# Patient Record
Sex: FEMALE | Race: WHITE | Employment: FULL TIME | URBAN - METROPOLITAN AREA
[De-identification: names, ages, dates, MRNs, and addresses within clinical notes are randomized per-mention and may not be internally consistent; named-entity substitution may affect disease eponyms.]

---

## 2022-03-13 ENCOUNTER — HOSPITAL ENCOUNTER (INPATIENT)
Age: 42
LOS: 2 days | Discharge: HOME | DRG: 885 | End: 2022-03-15
Attending: EMERGENCY MEDICINE | Admitting: PSYCHIATRY & NEUROLOGY
Payer: COMMERCIAL

## 2022-03-13 DIAGNOSIS — T14.91XA SUICIDE ATTEMPT (HCC): Primary | ICD-10-CM

## 2022-03-13 PROBLEM — E66.01 MORBID OBESITY (HCC): Status: ACTIVE | Noted: 2022-03-13

## 2022-03-13 PROBLEM — F33.2 MAJOR DEPRESSIVE DISORDER, RECURRENT EPISODE, SEVERE WITH ANXIOUS DISTRESS (HCC): Status: ACTIVE | Noted: 2022-03-13

## 2022-03-13 PROBLEM — F10.20 ALCOHOLISM (HCC): Status: ACTIVE | Noted: 2022-03-13

## 2022-03-13 PROBLEM — R45.851 DEPRESSION WITH SUICIDAL IDEATION: Status: ACTIVE | Noted: 2022-03-13

## 2022-03-13 PROBLEM — F32.A DEPRESSION WITH SUICIDAL IDEATION: Status: ACTIVE | Noted: 2022-03-13

## 2022-03-13 LAB
ABSOLUTE EOS #: 0 K/UL (ref 0–0.4)
ABSOLUTE LYMPH #: 1.9 K/UL (ref 1–4.8)
ABSOLUTE MONO #: 0.8 K/UL (ref 0.1–1.3)
ANION GAP SERPL CALCULATED.3IONS-SCNC: 12 MMOL/L (ref 9–17)
BASOPHILS # BLD: 1 % (ref 0–2)
BASOPHILS ABSOLUTE: 0.1 K/UL (ref 0–0.2)
BUN BLDV-MCNC: 14 MG/DL (ref 6–20)
CALCIUM SERPL-MCNC: 9.5 MG/DL (ref 8.6–10.4)
CHLORIDE BLD-SCNC: 104 MMOL/L (ref 98–107)
CO2: 23 MMOL/L (ref 20–31)
CREAT SERPL-MCNC: 0.94 MG/DL (ref 0.5–0.9)
EOSINOPHILS RELATIVE PERCENT: 0 % (ref 0–4)
ETHANOL PERCENT: <0.01 %
ETHANOL: <10 MG/DL
GFR AFRICAN AMERICAN: >60 ML/MIN
GFR NON-AFRICAN AMERICAN: >60 ML/MIN
GFR SERPL CREATININE-BSD FRML MDRD: ABNORMAL ML/MIN/{1.73_M2}
GLUCOSE BLD-MCNC: 109 MG/DL (ref 70–99)
HCG QUALITATIVE: NEGATIVE
HCT VFR BLD CALC: 36.4 % (ref 36–46)
HEMOGLOBIN: 12 G/DL (ref 12–16)
LYMPHOCYTES # BLD: 17 % (ref 24–44)
MCH RBC QN AUTO: 26.9 PG (ref 26–34)
MCHC RBC AUTO-ENTMCNC: 33 G/DL (ref 31–37)
MCV RBC AUTO: 81.6 FL (ref 80–100)
MONOCYTES # BLD: 7 % (ref 1–7)
PDW BLD-RTO: 16.5 % (ref 11.5–14.9)
PLATELET # BLD: 298 K/UL (ref 150–450)
PMV BLD AUTO: 7.6 FL (ref 6–12)
POTASSIUM SERPL-SCNC: 3.8 MMOL/L (ref 3.7–5.3)
RBC # BLD: 4.46 M/UL (ref 4–5.2)
SARS-COV-2, RAPID: NOT DETECTED
SEG NEUTROPHILS: 75 % (ref 36–66)
SEGMENTED NEUTROPHILS ABSOLUTE COUNT: 8.2 K/UL (ref 1.3–9.1)
SODIUM BLD-SCNC: 139 MMOL/L (ref 135–144)
SPECIMEN DESCRIPTION: NORMAL
WBC # BLD: 11 K/UL (ref 3.5–11)

## 2022-03-13 PROCEDURE — 99223 1ST HOSP IP/OBS HIGH 75: CPT | Performed by: PSYCHIATRY & NEUROLOGY

## 2022-03-13 PROCEDURE — 84703 CHORIONIC GONADOTROPIN ASSAY: CPT

## 2022-03-13 PROCEDURE — 80048 BASIC METABOLIC PNL TOTAL CA: CPT

## 2022-03-13 PROCEDURE — 6370000000 HC RX 637 (ALT 250 FOR IP): Performed by: PSYCHIATRY & NEUROLOGY

## 2022-03-13 PROCEDURE — G0480 DRUG TEST DEF 1-7 CLASSES: HCPCS

## 2022-03-13 PROCEDURE — 87635 SARS-COV-2 COVID-19 AMP PRB: CPT

## 2022-03-13 PROCEDURE — 99283 EMERGENCY DEPT VISIT LOW MDM: CPT

## 2022-03-13 PROCEDURE — APPSS180 APP SPLIT SHARED TIME > 60 MINUTES: Performed by: NURSE PRACTITIONER

## 2022-03-13 PROCEDURE — 85025 COMPLETE CBC W/AUTO DIFF WBC: CPT

## 2022-03-13 PROCEDURE — 1240000000 HC EMOTIONAL WELLNESS R&B

## 2022-03-13 PROCEDURE — 36415 COLL VENOUS BLD VENIPUNCTURE: CPT

## 2022-03-13 PROCEDURE — 99222 1ST HOSP IP/OBS MODERATE 55: CPT | Performed by: INTERNAL MEDICINE

## 2022-03-13 RX ORDER — MIRTAZAPINE 30 MG/1
30 TABLET, FILM COATED ORAL NIGHTLY
Status: ON HOLD | COMMUNITY
End: 2022-03-15 | Stop reason: HOSPADM

## 2022-03-13 RX ORDER — LORAZEPAM 2 MG/ML
2 INJECTION INTRAMUSCULAR EVERY 4 HOURS PRN
Status: DISCONTINUED | OUTPATIENT
Start: 2022-03-13 | End: 2022-03-15 | Stop reason: HOSPADM

## 2022-03-13 RX ORDER — BUPROPION HYDROCHLORIDE 300 MG/1
300 TABLET ORAL NIGHTLY
COMMUNITY

## 2022-03-13 RX ORDER — HYDROXYZINE 50 MG/1
50 TABLET, FILM COATED ORAL 3 TIMES DAILY PRN
Status: DISCONTINUED | OUTPATIENT
Start: 2022-03-13 | End: 2022-03-15 | Stop reason: HOSPADM

## 2022-03-13 RX ORDER — SERTRALINE HYDROCHLORIDE 25 MG/1
25 TABLET, FILM COATED ORAL DAILY
Status: DISCONTINUED | OUTPATIENT
Start: 2022-03-13 | End: 2022-03-15 | Stop reason: HOSPADM

## 2022-03-13 RX ORDER — ACETAMINOPHEN 325 MG/1
650 TABLET ORAL EVERY 4 HOURS PRN
Status: DISCONTINUED | OUTPATIENT
Start: 2022-03-13 | End: 2022-03-15 | Stop reason: HOSPADM

## 2022-03-13 RX ORDER — HALOPERIDOL 5 MG
5 TABLET ORAL EVERY 4 HOURS PRN
Status: DISCONTINUED | OUTPATIENT
Start: 2022-03-13 | End: 2022-03-15 | Stop reason: HOSPADM

## 2022-03-13 RX ORDER — DIPHENHYDRAMINE HYDROCHLORIDE 50 MG/ML
50 INJECTION INTRAMUSCULAR; INTRAVENOUS EVERY 4 HOURS PRN
Status: DISCONTINUED | OUTPATIENT
Start: 2022-03-13 | End: 2022-03-15 | Stop reason: HOSPADM

## 2022-03-13 RX ORDER — LORAZEPAM 1 MG/1
2 TABLET ORAL EVERY 4 HOURS PRN
Status: DISCONTINUED | OUTPATIENT
Start: 2022-03-13 | End: 2022-03-15 | Stop reason: HOSPADM

## 2022-03-13 RX ORDER — BUPROPION HYDROCHLORIDE 300 MG/1
300 TABLET ORAL DAILY
Status: DISCONTINUED | OUTPATIENT
Start: 2022-03-13 | End: 2022-03-15 | Stop reason: HOSPADM

## 2022-03-13 RX ORDER — IBUPROFEN 400 MG/1
400 TABLET ORAL EVERY 6 HOURS PRN
Status: DISCONTINUED | OUTPATIENT
Start: 2022-03-13 | End: 2022-03-15 | Stop reason: HOSPADM

## 2022-03-13 RX ORDER — MINOCYCLINE HYDROCHLORIDE 50 MG/1
50 CAPSULE ORAL 2 TIMES DAILY
Status: ON HOLD | COMMUNITY
End: 2022-03-15 | Stop reason: HOSPADM

## 2022-03-13 RX ORDER — POLYETHYLENE GLYCOL 3350 17 G/17G
17 POWDER, FOR SOLUTION ORAL DAILY PRN
Status: DISCONTINUED | OUTPATIENT
Start: 2022-03-13 | End: 2022-03-15 | Stop reason: HOSPADM

## 2022-03-13 RX ORDER — MAGNESIUM HYDROXIDE/ALUMINUM HYDROXICE/SIMETHICONE 120; 1200; 1200 MG/30ML; MG/30ML; MG/30ML
30 SUSPENSION ORAL EVERY 6 HOURS PRN
Status: DISCONTINUED | OUTPATIENT
Start: 2022-03-13 | End: 2022-03-15 | Stop reason: HOSPADM

## 2022-03-13 RX ORDER — HALOPERIDOL 5 MG/ML
5 INJECTION INTRAMUSCULAR EVERY 4 HOURS PRN
Status: DISCONTINUED | OUTPATIENT
Start: 2022-03-13 | End: 2022-03-15 | Stop reason: HOSPADM

## 2022-03-13 RX ORDER — TRAZODONE HYDROCHLORIDE 50 MG/1
50 TABLET ORAL NIGHTLY PRN
Status: DISCONTINUED | OUTPATIENT
Start: 2022-03-13 | End: 2022-03-15 | Stop reason: HOSPADM

## 2022-03-13 RX ADMIN — ACETAMINOPHEN 650 MG: 325 TABLET, FILM COATED ORAL at 12:08

## 2022-03-13 RX ADMIN — SERTRALINE HYDROCHLORIDE 25 MG: 25 TABLET ORAL at 17:06

## 2022-03-13 RX ADMIN — HYDROXYZINE HYDROCHLORIDE 50 MG: 50 TABLET, FILM COATED ORAL at 04:38

## 2022-03-13 RX ADMIN — BUPROPION HYDROCHLORIDE 300 MG: 300 TABLET, FILM COATED, EXTENDED RELEASE ORAL at 17:06

## 2022-03-13 ASSESSMENT — PAIN SCALES - GENERAL
PAINLEVEL_OUTOF10: 3
PAINLEVEL_OUTOF10: 0

## 2022-03-13 ASSESSMENT — SLEEP AND FATIGUE QUESTIONNAIRES
DIFFICULTY STAYING ASLEEP: YES
DIFFICULTY FALLING ASLEEP: YES
DO YOU USE A SLEEP AID: YES
DIFFICULTY ARISING: NO
DO YOU HAVE DIFFICULTY SLEEPING: YES
SLEEP PATTERN: DIFFICULTY FALLING ASLEEP;DISTURBED/INTERRUPTED SLEEP;RESTLESSNESS
AVERAGE NUMBER OF SLEEP HOURS: 4
RESTFUL SLEEP: NO

## 2022-03-13 ASSESSMENT — LIFESTYLE VARIABLES: HISTORY_ALCOHOL_USE: NO

## 2022-03-13 NOTE — BH NOTE
Patient recorded numbers out of her cell phone. Educated on phone being locked up with security until discharge.

## 2022-03-13 NOTE — H&P
[facility]  Ella Vivar MD; Jeff Eubanks MD; Dorcas Miranda MD; MD Russ Portillo MD; MD JOSE Wynn [facility]   [address] / HISTORY AND PHYSICAL EXAMINATION            Date:   3/13/2022  Patient name:  Mannie Santos  Date of admission:  3/13/2022 12:40 AM  MRN:   [id number]  Account:  [de-identified]  YOB: 1980  PCP:    No primary care provider on file. Room:   [address] [place] [address]  Code Status:    Full Code    Physician Requesting Consult: Skylar Vernon MD    Reason for Consult: Medical consult    Chief Complaint:     Chief Complaint   Patient presents with    Suicidal       History Obtained From:     patient    History of Present Illness:     81-year-old female with underlying history of morbid obesity, alcoholism, admitted to inpatient psych for suicidal ideation ,  History of depression and anxiety    Past Medical History:     Past Medical History:   Diagnosis Date    Depression         Past Surgical History:     History reviewed. No pertinent surgical history. Medications Prior to Admission:     Prior to Admission medications    Medication Sig Start Date End Date Taking? Authorizing Provider   buPROPion (WELLBUTRIN XL) 300 MG extended release tablet Take 300 mg by mouth nightly    Yes Historical Provider, MD   mirtazapine (REMERON) 30 MG tablet Take 30 mg by mouth nightly   Yes Historical Provider, MD   minocycline (MINOCIN;DYNACIN) 50 MG capsule Take 50 mg by mouth 2 times daily   Yes Historical Provider, MD        Allergies:     Patient has no known allergies. Social History:     Tobacco:    reports that she has quit smoking. She has never used smokeless tobacco.  Alcohol:      reports current alcohol use. Drug Use:  reports no history of drug use. Family History:     History reviewed. No pertinent family history.     Review of Systems:     Positive and Negative as described in HPI. CONSTITUTIONAL:  negative for fevers, chills, sweats, fatigue, weight loss  HEENT:  negative for vision, hearing changes, runny nose, throat pain  RESPIRATORY:  negative for shortness of breath, cough, congestion, wheezing. CARDIOVASCULAR:  negative for chest pain, palpitations. GASTROINTESTINAL:  negative for nausea, vomiting, diarrhea, constipation, change in bowel habits, abdominal pain   GENITOURINARY:  negative for difficulty of urination, burning with urination, frequency   INTEGUMENT:  negative for rash, skin lesions, easy bruising   HEMATOLOGIC/LYMPHATIC:  negative for swelling/edema   ALLERGIC/IMMUNOLOGIC:  negative for urticaria , itching  ENDOCRINE:  negative increase in drinking, increase in urination, hot or cold intolerance  MUSCULOSKELETAL:  negative joint pains, muscle aches, swelling of joints  NEUROLOGICAL:  negative for headaches, dizziness, lightheadedness, numbness, pain, tingling extremities    Physical Exam:     /73   Pulse 73   Temp 97.1 °F (36.2 °C) (Temporal)   Resp 14   Ht 5' 5\" (1.651 m)   Wt 220 lb (99.8 kg)   LMP 2022   SpO2 100%   BMI 36.61 kg/m²   Temp (24hrs), Av.1 °F (36.7 °C), Min:97.1 °F (36.2 °C), Max:99.1 °F (37.3 °C)    No results for input(s): POCGLU in the last 72 hours. No intake or output data in the 24 hours ending 22 1607    General Appearance:  alert, well appearing, and in no acute distress  Mental status: oriented to person, place, and time with normal affect  Head:  normocephalic, atraumatic.   Eye: no icterus, redness, pupils equal and reactive, extraocular eye movements intact, conjunctiva clear  Ear: normal external ear, no discharge, hearing intact  Nose:  no drainage noted  Mouth: mucous membranes moist  Neck: supple, no carotid bruits, thyroid not palpable  Lungs: Bilateral equal air entry, clear to ausculation, no wheezing, rales or rhonchi, normal effort  Cardiovascular: normal rate, regular rhythm, no murmur, gallop, rub.   Abdomen: Soft, nontender, nondistended, normal bowel sounds, no hepatomegaly or splenomegaly  Neurologic: There are no new focal motor or sensory deficits, normal muscle tone and bulk, no abnormal sensation, normal speech, cranial nerves II through XII grossly intact  Skin: No gross lesions, rashes, bruising or bleeding on exposed skin area  Extremities:  peripheral pulses palpable, no pedal edema or calf pain with palpation  Psych: normal affect    Investigations:      Laboratory Testing:  Recent Results (from the past 24 hour(s))   CBC with Auto Differential    Collection Time: 03/13/22  1:57 AM   Result Value Ref Range    WBC 11.0 3.5 - 11.0 k/uL    RBC 4.46 4.0 - 5.2 m/uL    Hemoglobin 12.0 12.0 - 16.0 g/dL    Hematocrit 36.4 36 - 46 %    MCV 81.6 80 - 100 fL    MCH 26.9 26 - 34 pg    MCHC 33.0 31 - 37 g/dL    RDW 16.5 (H) 11.5 - 14.9 %    Platelets 751 364 - 181 k/uL    MPV 7.6 6.0 - 12.0 fL    Seg Neutrophils 75 (H) 36 - 66 %    Lymphocytes 17 (L) 24 - 44 %    Monocytes 7 1 - 7 %    Eosinophils % 0 0 - 4 %    Basophils 1 0 - 2 %    Segs Absolute 8.20 1.3 - 9.1 k/uL    Absolute Lymph # 1.90 1.0 - 4.8 k/uL    Absolute Mono # 0.80 0.1 - 1.3 k/uL    Absolute Eos # 0.00 0.0 - 0.4 k/uL    Basophils Absolute 0.10 0.0 - 0.2 k/uL   Basic Metabolic Panel w/ Reflex to MG    Collection Time: 03/13/22  1:57 AM   Result Value Ref Range    Glucose 109 (H) 70 - 99 mg/dL    BUN 14 6 - 20 mg/dL    CREATININE 0.94 (H) 0.50 - 0.90 mg/dL    Calcium 9.5 8.6 - 10.4 mg/dL    Sodium 139 135 - 144 mmol/L    Potassium 3.8 3.7 - 5.3 mmol/L    Chloride 104 98 - 107 mmol/L    CO2 23 20 - 31 mmol/L    Anion Gap 12 9 - 17 mmol/L    GFR Non-African American >60 >60 mL/min    GFR African American >60 >60 mL/min    GFR Comment         HCG Qualitative, Serum    Collection Time: 03/13/22  1:57 AM   Result Value Ref Range    hCG Qual NEGATIVE NEGATIVE   COVID-19, Rapid    Collection Time: 03/13/22  1:57 AM Specimen: Nasopharyngeal Swab   Result Value Ref Range    Specimen Description . NASOPHARYNGEAL SWAB     SARS-CoV-2, Rapid Not Detected Not Detected   Ethanol    Collection Time: 03/13/22  1:57 AM   Result Value Ref Range    Ethanol <10 <10 mg/dL    Ethanol percent <0.010 %       Imaging/Diagonstics:  No results found. Assessment :      Hospital Problems           Last Modified POA    * (Principal) Major depressive disorder, recurrent episode, severe with anxious distress ([facility] 3/13/2022 Yes    Alcoholism ([facility] 3/13/2022 Yes    Morbid obesity ([facility] 3/13/2022 Yes          Plan:     3 73-year-old female with underlying history of anxiety and depression, admitted to inpatient psych for suicidal ideations,  2. Alcoholism, watch for withdrawal,   3. Morbid obesity, diet and exercise advised,  4. Full CODE STATUS    Consultations:   Funmi Call MD  3/13/2022  4:07 PM    Copy sent to Dr. Gould primary care provider on file. Please note that this chart was generated using voice recognition Dragon dictation software. Although every effort was made to ensure the accuracy of this automated transcription, some errors in transcription may have occurred.
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
## 2022-03-13 NOTE — PROGRESS NOTES
Behavioral Services  Medicare Certification Upon Admission    I certify that this patient's inpatient psychiatric hospital admission is medically necessary for:    [x] (1) Treatment which could reasonably be expected to improve this patient's condition,       [x] (2) Or for diagnostic study;     AND     [x](2) The inpatient psychiatric services are provided while the individual is under the care of a physician and are included in the individualized plan of care.     Estimated length of stay/service 3-9 days    Plan for post-hospital care -outpatient care    Electronically signed by Flori Branch MD on 3/13/2022 at 4:27 PM

## 2022-03-13 NOTE — PLAN OF CARE
Problem: Suicide risk  Goal: Provide patient with safe environment  Description: Provide patient with safe environment  Outcome: Ongoing  Note: Patient is provided a safe environment. Safety checks are done every 15 minutes. Problem: Altered Mood, Depressive Behavior:  Goal: Able to verbalize support systems  Description: Able to verbalize support systems  Outcome: Ongoing  Note: Patient does not verbalize a support system. Problem: Depressive Behavior With or Without Suicide Precautions:  Goal: Able to verbalize acceptance of life and situations over which he or she has no control  Description: Able to verbalize acceptance of life and situations over which he or she has no control  Outcome: Ongoing  Note: Patient has been evasive and isolative to her room. She admits to depression and anxiety. She has been encouraged to eat, drink and take care of ADL's.

## 2022-03-13 NOTE — CARE COORDINATION
BHI Biopsychosocial Assessment    Current Level of Psychosocial Functioning     Independent X   Dependent    Minimal Assist     Comments:    Psychosocial High Risk Factors (check all that apply)    Unable to obtain meds   Chronic illness/pain    Substance abuse   Lack of Family Support X   Financial stress   Isolation   Inadequate Community Resources  Suicide attempt(s)  Not taking medications   Victim of crime   Developmental Delay  Unable to manage personal needs    Age 73 or older   Homeless  No transportation   Readmission within 30 days  Unemployment  Traumatic Event    Comments:   Psychiatric Advanced Directives:  N/A   Family to Involve in Treatment:   Pt declined family involvement   Sexual Orientation:    Heterosexual   Patient Strengths:  Pt is independent in self-care activities, pt has housing, pt is employed. Patient Barriers:   Pt lacks insight. Opiate Education Provided:  N/A   CMHC/mental health history:  Not linked  Plan of Care   medication management, group/individual therapies, family meetings, psycho -education, treatment team meetings to assist with stabilization    Initial Discharge Plan:    Pt plans to return home and be linked to local providers for therapy and medication. Clinical Summary:    Pt is a 37 y.o. female who presented to the ED with SI to stab herself in an interrupted suicide attempt, where her daughter called the police. Pt reports she is not linked with a mental health agency. Pt lacks family support. Pt lives with her  and 2 children, ages 21 & 12. Pt reports her  is supportive \"when he wants to be\". Pt denied trauma hx. Pt denied AOD abuse. Pt denied legal issues. Pt denied past suicide attempt. Pt works in accounting.

## 2022-03-13 NOTE — BH NOTE
On call provider, Dr. Mary Colmenares, notified of best practice advisory suggesting to place patient on suicide precautions. Provider to discontinue the order as patient does not meet criteria for suicide precautions at this time. Continue to observe patient on every 15 minute checks.
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
## 2022-03-13 NOTE — ED PROVIDER NOTES
[facility] EMERGENCY DEPARTMENT ENCOUNTER    Pt Name: Lauren aLng  MRN: [id number]  [place] 1980  Date of evaluation: 3/13/22  CHIEF COMPLAINT       Chief Complaint   Patient presents with    Suicidal     HISTORY OF PRESENT ILLNESS     Mental Health Problem  Presenting symptoms: hallucinations, suicidal threats and suicide attempt    Degree of incapacity (severity): Moderate  Onset quality:  Gradual  Timing:  Constant  Progression:  Unchanged    Patient intended to cut herself with a knife tonight and came in on a pink slip. Patient is frustrated about being in the ED. REVIEW OF SYSTEMS     Review of Systems   Psychiatric/Behavioral: Positive for hallucinations. All other systems reviewed and are negative. PASTMEDICAL HISTORY     Past Medical History:   Diagnosis Date    Depression      Past Problem List  There is no problem list on file for this patient. SURGICAL HISTORY     History reviewed. No pertinent surgical history. CURRENT MEDICATIONS       Previous Medications    BUPROPION (WELLBUTRIN XL) 300 MG EXTENDED RELEASE TABLET    Take 300 mg by mouth every morning     ALLERGIES     has No Known Allergies. FAMILY HISTORY     has no family status information on file. SOCIAL HISTORY       Social History     Tobacco Use    Smoking status: Former Smoker    Smokeless tobacco: Never Used   Substance Use Topics    Alcohol use: Yes     Comment: wine twice a week    Drug use: Never     PHYSICAL EXAM     INITIAL VITALS: BP (!) 151/101   Pulse 100   Temp 98.2 °F (36.8 °C) (Oral)   Resp 18   Ht 5' 5\" (1.651 m)   Wt 220 lb (99.8 kg)   LMP 03/08/2022   SpO2 100%   BMI 36.61 kg/m²    Physical Exam  Constitutional:       General: She is not in acute distress. Appearance: Normal appearance. She is well-developed. She is not diaphoretic. HENT:      Head: Normocephalic and atraumatic. Right Ear: External ear normal.      Left Ear: External ear normal.      Nose: Nose normal. No congestion. Mouth/Throat:      Mouth: Mucous membranes are moist.      Pharynx: Oropharynx is clear. Eyes:      General:         Right eye: No discharge. Left eye: No discharge. Conjunctiva/sclera: Conjunctivae normal.      Pupils: Pupils are equal, round, and reactive to light. Neck:      Trachea: No tracheal deviation. Cardiovascular:      Rate and Rhythm: Normal rate and regular rhythm. Pulses: Normal pulses. Heart sounds: Normal heart sounds. Pulmonary:      Effort: Pulmonary effort is normal. No respiratory distress. Breath sounds: Normal breath sounds. No stridor. No wheezing or rales. Abdominal:      Palpations: Abdomen is soft. Tenderness: There is no abdominal tenderness. There is no guarding or rebound. Musculoskeletal:         General: No tenderness or deformity. Normal range of motion. Cervical back: Normal range of motion and neck supple. Skin:     General: Skin is warm and dry. Capillary Refill: Capillary refill takes less than 2 seconds. Findings: No erythema or rash. Neurological:      General: No focal deficit present. Mental Status: She is alert and oriented to person, place, and time. Coordination: Coordination normal.         MEDICAL DECISION MAKING:     Patient is medically cleared for psychiatric evaluation       CRITICAL CARE:       PROCEDURES:    Procedures    DIAGNOSTIC RESULTS   EKG:All EKG's are interpreted by the Emergency Department Physician who either signs or Co-signs this chart in the absence of a cardiologist.        RADIOLOGY:All plain film, CT, MRI, and formal ultrasound images (except ED bedside ultrasound) are read by the radiologist, see reports below, unless otherwisenoted in MDM or here. No orders to display     LABS: All lab results were reviewed by myself, and all abnormals are listed below.   Labs Reviewed   CBC WITH AUTO DIFFERENTIAL - Abnormal; Notable for the following components:       Result Value    RDW 16.5 (*)     Seg Neutrophils 75 (*)     Lymphocytes 17 (*)     All other components within normal limits   BASIC METABOLIC PANEL W/ REFLEX TO MG FOR LOW K - Abnormal; Notable for the following components:    Glucose 109 (*)     CREATININE 0.94 (*)     All other components within normal limits   COVID-19, RAPID   HCG, SERUM, QUALITATIVE   ETHANOL   URINE DRUG SCREEN       EMERGENCY DEPARTMENTCOURSE:         Vitals:    Vitals:    03/13/22 0034   BP: (!) 151/101   Pulse: 100   Resp: 18   Temp: 98.2 °F (36.8 °C)   TempSrc: Oral   SpO2: 100%   Weight: 220 lb (99.8 kg)   Height: 5' 5\" (1.651 m)       The patient was given the following medications while in the emergency department:  No orders of the defined types were placed in this encounter. CONSULTS:  None    FINAL IMPRESSION      1. Suicide attempt ([facility] [address]          DISPOSITION/PLAN   DISPOSITION        PATIENT REFERRED TO:  No follow-up provider specified.   DISCHARGE MEDICATIONS:  New Prescriptions    No medications on file     The care is provided during an unprecedented national emergency due to the novel coronavirus, COVID [address] [facility], [address] [address], DO  03/13/22 8454

## 2022-03-13 NOTE — H&P
Department of Psychiatry  Attending Physician Psychiatric Assessment     Reason for Admission to Psychiatric Unit:  Concerns about patient's safety in the community    CHIEF COMPLAINT:  Suicidal ideation with interrupted attempt to end life by stabbing self. History obtained from: Patient, electronic medical record          HISTORY OF PRESENT ILLNESS:    Marion Barrett is a 37 y.o. female who has a past medical history of depression, hyperlipidemia, restless leg syndrome, and insomnia. Patient presented to the ED with EMS following an attempt to harm herself with a knife at home. Noted that patient required physical restraint in her home by her  and child as she was attempting to stab herself with a knife. Patient admitted involuntarily. Did not sign in upon presentation to unit. Patient is resting in bed at time of assessment. Quiet and reluctant to engage in conversation. She is tearful. Minimizing of events that led to admission. She reports significant marital stressors. States that she and her  have been having problems for years and argue frequently. 3 months ago, patient reports her  invited a female friend to live with them and she believes they are having an affair. Patient has been having difficulty with this living situation. Endorses significant depression over the last few months and has been having thoughts to end her life. She states that there was a verbal altercation with the woman and patient lost control. She is evasive in describing events, but per ED documentation she grabbed a knife from her 's pocket and ran into the kitchen attempting to harm herself. Patient endorses depressive episodes lasting 2 weeks or longer in which she feels down and depressed more days than not, for most of the day.   She states that during her depressive episodes she has decreased energy and motivation, feelings of hopelessness and helplessness, insomnia (particularly with falling asleep and waking up multiple times throughout the evening). She endorses anhedonia and has been withdrawing from friends, family, and activities she once enjoyed. States that she is having difficulty focusing and concentrating. She feels that over the past year she has had significant problems with anxiety that she identifies as linked with her mood. Denies having anxiety without mood disturbance. Patient unable to identify any manic/hypomanic episodes. She does feel that when she is depressed her mind begins playing tricks on her and she over exaggerate situations. She denies perceptual disturbances. Believes it may be possible that she experiences paranoia. She denies receiving special messages from the media or grandiose thoughts about self. Patient denies any previous psychiatric admissions. Does not follow-up with any mental health providers. Is prescribed Wellbutrin 300 mg and mirtazapine. Reports that she has been on Wellbutrin for many years and is unsure if it has any effect. Mirtazapine was added recently for insomnia, which she reports has been beneficial.  Previously trialed sertraline 15 years ago. Patient denies seeing a therapist but does feel that it would be helpful. Patient denies any legal history. She denies recreational substance use. Does endorse consumption of approximately 2 glasses of wine every other day of the week. Urine toxicology less than 0.01%. No urine toxicology available to review at this time. Patient requires inpatient hospitalization for safety and stability as she is a significant threat to self. History of head trauma: [] Yes [x] No    History of seizures: [] Yes [x] No  History of violence or aggression: [] Yes [x] No         PSYCHIATRIC HISTORY:  [x] Yes [] No    Currently follows with her primary care provider Dr. Aliyah Enriquez MD with [address] [place] [address] [facility].   Denies lifetime suicide attempts prior to this interrupted attempt  Denies previous psychiatric hospital admissions    Current psychiatric medications includes: Wellbutrin 300 mg mirtazapine  Past psychiatric medications includes:Zoloft 15 years ago  Home medication compliance: Yes    Adverse reactions from psychotropic medications: No         Lifetime Psychiatric Review of Systems         Depression: depressed mood, anhedonia, insomnia, psychomotor agitation, fatigue, feelings of worthlessness/guilt, difficulty concentrating, hopelessness, impaired memory, recurrent thoughts of death, suicidal thoughts without plan, suicidal thoughts with specific plan and suicidal attempt     Anxiety: excessive worry or anxiety, difficulty controlling the worry, restlessness; feeling keyed up or on edge, easily fatigued, difficulty concentrating, irritability and muscle tension, present with mood disturbance     Panic Attacks: denies     Elma or Hypomania: denies     Phobias: denies     Obsessions and Compulsions: denies     Body or Vocal Tics: denies     Visual Hallucinations: denies     Auditory Hallucinations: denies     Delusions/Paranoia: endorses concern for paranoia. Does not appear paranoid during assessment. PTSD: denies    Past Medical History:        Diagnosis Date    Depression        Past Surgical History:    History reviewed. No pertinent surgical history. Allergies:  Patient has no known allergies. Social History:     Born in: [facility]  Family: Reports raised by her mother. Denies any history of neglect. Highest Level of Education: some college  Occupation: Works full-time from home. Marital Status: . Reports marital issues.  invited another female to live in the home. Patient suspects he is having an affair. Children: 2 children. 51-year-old daughter living at home. 79-year-old son living at home.   Residence: Lives in a home with her 2 children, , and female guest of the  who has been there for 3 months. Stressors:family, financial and marital  Patient Assets/Supportive Factors: Family and community support present (connectedness)         DRUG USE HISTORY  Social History     Tobacco Use   Smoking Status Former Smoker   Smokeless Tobacco Never Used     Social History     Substance and Sexual Activity   Alcohol Use Yes    Comment: wine twice a week     Social History     Substance and Sexual Activity   Drug Use Never     No urine toxicology to review. She denies recreational substance use  EtOH less than 0.01%. Reports consumption of 2 glasses of wine every other day. LEGAL HISTORY:   HISTORY OF INCARCERATION: [] Yes [x] No    Family History:   History reviewed. No pertinent family history. Psychiatric Family History  Patient denies psychiatric family history. Suicides in family: [] Yes [x] No    Substance use in family: [] Yes [x] No         PHYSICAL EXAM:  Vitals:  /73   Pulse 73   Temp 97.1 °F (36.2 °C) (Temporal)   Resp 14   Ht 5' 5\" (1.651 m)   Wt 220 lb (99.8 kg)   LMP 03/08/2022   SpO2 100%   BMI 36.61 kg/m²     Pain: denies any pain or discomfort    LABS:  Labs reviewed: [x] Yes           Review of Systems   Constitutional: Negative for chills and weight loss. HENT: Negative for ear pain and nosebleeds. Eyes: Negative for blurred vision and photophobia. Respiratory: Negative for cough, shortness of breath and wheezing. Cardiovascular: Negative for chest pain and palpitations. Gastrointestinal: Negative for abdominal pain, diarrhea and vomiting. Genitourinary: Negative for dysuria and urgency. Musculoskeletal: Negative for falls and joint pain. Skin: Negative for itching and rash. Neurological: Negative for tremors, seizures and weakness. Endo/Heme/Allergies: Does not bruise/bleed easily. Physical Exam:   Constitutional:  Appears well-developed and well-nourished, no acute distress. HENT:   Head: Normocephalic and atraumatic.    Eyes: Conjunctivae are normal. Right eye exhibits no discharge. Left eye exhibits no discharge. No scleral icterus. Neck: Normal range of motion. Neck supple. Pulmonary/Chest:  No respiratory distress or accessory muscle use, no wheezing. Cardiac: Regular rate and rhythm. Abdominal: Soft. Non-tender. Exhibits no distension. Musculoskeletal: Normal range of motion. Exhibits no edema. Neurological: cranial nerves II-XII grossly in tact, normal gait and station. Skin: Skin is warm and dry. Patient is not diaphoretic. No erythema. Mental Status Examination:    Level of consciousness: Awake and alert  Appearance:  Appropriate attire, resting in bed, fair grooming   Behavior/Motor: Approachable, no psychomotor abnormalities noted  Attitude toward examiner:  Cooperative, attentive, good eye contact  Speech: Normal rate, volume, and tone. Mood: depressed  Affect: Flat/blunted  Thought processes:  Linear, evasive  Thought content: active with a plan to stab self              Denies homicidal ideations               Denies perceptual disturbances              Denies delusions              Endorses modest paranoia  Cognition:  Oriented to self, location, time, situation  Concentration: Sustained  Memory: recent and remote memory intact  Insight &Judgment: impaired judgment         DSM-5 Diagnosis    Principal Problem: Major depressive disorder, recurrent episode, severe with anxious distress ([facility] [address]    Psychosocial and Contextual factors:  Financial   Relationship   Living situation       Past Medical History:   Diagnosis Date    Depression         TREATMENT CONSIDERATIONS    Continue inpatient psychiatric treatment. Home medications reviewed. Medications as discussed with attending:  Continue Wellbutrin 300 mg p.o. daily and mirtazapine 30 mg p.o. nightly  Monitor need and frequency of PRN medications. Attempt to develop insight. Follow-up daily while inpatient.    Reviewed risks and benefits as well as potential side effects with patient. CONSULT:  [x] Yes [] No  Internal medicine for medical management/medical H&P      Risk Management: close watch per standard protocol      Psychotherapy: participation in milieu and group and individual sessions with Attending Physician,  and Physician Assistant/CNP      Estimated length of stay:  2-14 days      GENERAL PATIENT/FAMILY EDUCATION  Patient will understand basic signs and symptoms, patient will understand benefits/risks and potential side effects from proposed medications, and patient will understand their role in recovery. Family is not active in patient's care. Patient assets that may be helpful during treatment include: Intent to participate and engage in treatment, sufficient fund of knowledge and intellect to understand and utilize treatments. Goals:    1) Remission of suicidal ideation. 2) Stabilization of symptoms prior to discharge. 3) Establish efficacy and tolerability of medications. Behavioral Services  Medicare Certification     Admission Day 1  I certify that this patient's inpatient psychiatric hospital admission is medically necessary for:    x (1) treatment which could reasonably be expected to improve this patient's condition, or    x (2) diagnostic study or its equivalent. Time Spent: 1 hour     Buster Enrique is a 37 y.o. female being evaluated face to face    --BETTIE Wilde CNP on 3/13/2022 at 12:49 PM    An electronic signature was used to authenticate this note. I independently saw and evaluated the patient. I reviewed the  documentation above. Any additional comments or changes to the midlevel provider's documentation are stated below otherwise agree with assessment. The patient was seen at bedside. The patient said that she was going to cut herself because she was overwhelmed. She had \"lost it\".   The patient states that she is stressed about her 's female friend who is staying with them at home at the moment. She also has 2 children living at home. Her daughter is 21years old and her son is 12. 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 Patient has been prescribed Wellbutrin for depression. She had found it helpful in the beginning but does not know if it is doing anything at the moment. The patient denies any history of suicide attempt. The patient states that she was raised by her mother. Her father had passed away when she was young. She denies any history of childhood abuse. We discussed treatment options. At this time we will continue with the Wellbutrin to minimize any worsening of her mood by discontinuing it. She has been on Remeron recently. We will go with a combination of Wellbutrin and Zoloft. Both medications have been ordered for her      PLAN  Medications as noted above  Attempt to develop insight  Psycho-education conducted. Supportive Therapy conducted.   Probable discharge is 3-9 days  Follow-up daily while on inpatient unit    Electronically signed by Celso Taylor MD on 3/13/22 at 4:28 PM EDT

## 2022-03-13 NOTE — ED NOTES
With permission from pt, SW contacted pt's  and informed  pt is being admitted to the hospital.      Pt does not want to speak and/or see  at this time if  were to come to the ED. Pt requesting limited information to be shared with  at this time.

## 2022-03-13 NOTE — BH NOTE
patient refused to attend wellness group at 1030 after encouragement from staff.   1:1 talk time provided as alternative to group session

## 2022-03-13 NOTE — BH NOTE
Patient medications verified at [address]    Wellbutrin  mg, nightly  Remeron 30 mg, Nightly  Minocycline 50 mg, Twice daily

## 2022-03-13 NOTE — PLAN OF CARE
[facility]  Initial Interdisciplinary Treatment Plan NO      Original treatment plan Date & Time: 3/13/2022   1053    Admission Type:  Admission Type:  Involuntary    Reason for admission:   Reason for Admission: SI, trying to stab herself with a knife, daughter called police    Estimated Length of Stay:  5-7days  Estimated Discharge Date: to be determined by physician    PATIENT STRENGTHS:  Patient Strengths:Strengths: Positive Support,Medication Compliance  Patient Strengths and Limitations:Limitations: Hopeless about future,Unrealistic self-view  Addictive Behavior: Addictive Behavior  In the past 3 months, have you felt or has someone told you that you have a problem with:  : None  Do you have a history of Chemical Use?: No  Do you have a history of Alcohol Use?: No  Do you have a history of Street Drug Abuse?: No  Histroy of Prescripton Drug Abuse?: No  Medical Problems:  Past Medical History:   Diagnosis Date    Depression      Status EXAM:Status and Exam  Normal: No  Facial Expression: Avoids Gaze,Flat,Expressionless  Affect: Appropriate  Level of Consciousness: Alert  Mood:Normal: No  Mood: Depressed,Anxious,Sad,Helpless,Empty  Motor Activity:Normal: Yes  Interview Behavior: Cooperative,Evasive  Preception: [place] to Person,[place] to Time,[place] to Place,[place] to Situation  Attention:Normal: No  Attention: Unable to Concentrate  Thought Processes: Circumstantial  Thought Content:Normal: No  Thought Content: Poverty of Content  Hallucinations: None  Delusions: No  Memory:Normal: Yes  Insight and Judgment: No  Insight and Judgment: Poor Insight,Poor Judgment  Present Suicidal Ideation: No  Present Homicidal Ideation: No    EDUCATION:   Learner Progress Toward Treatment Goals: reviewed group plans and strategies for care    Method:group therapy, medication compliance, individualized assessments and care planning    Outcome: needs reinforcement    PATIENT GOALS: to be discussed with patient within 72 hours    PLAN/TREATMENT RECOMMENDATIONS:     continue group therapy , medications compliance, goal setting, individualized assessments and care, continue to monitor pt on unit      SHORT-TERM GOALS:   Time frame for Short-Term Goals: 5-7 days    LONG-TERM GOALS:  Time frame for Long-Term Goals: 6 months  Members Present in Team Meeting: See Signature Sheet    Fabiola Prather

## 2022-03-13 NOTE — ED NOTES
Mode of arrival (squad #, walk in, police, etc) : Walked into triage accompanied by WO[facility] complaint(s): Suicidal        Arrival Note (brief scenario, treatment PTA, etc). : Patient was brought in by the police and reportedly pt tried to get a knife out of her 's pocket to cut herself and then went to the kitchen to get a knife to cut herself. Patient denies suicidal or homicidal ideation. Patient pink slipped by the police. Denies previous psych inpatient admissions. Patient is not connected to any mental health center. Denies any drug or alcohol problem. States she has been taking her Wellbutrin. Denies hearing voices. A/O X 3        C= \"Have you ever felt that you should Cut down on your drinking? \"  No  A= \"Have people Annoyed you by criticizing your drinking? \"  No  G= \"Have you ever felt bad or Guilty about your drinking? \"  No  E= \"Have you ever had a drink as an Eye-opener first thing in the morning to steady your nerves or to help a hangover? \"  No      Deferred []      Reason for deferring: N/A    *If yes to two or more: probable alcohol abuse. Aston Doherty RN  03/13/22 3992

## 2022-03-14 PROCEDURE — 6370000000 HC RX 637 (ALT 250 FOR IP): Performed by: PSYCHIATRY & NEUROLOGY

## 2022-03-14 PROCEDURE — 99232 SBSQ HOSP IP/OBS MODERATE 35: CPT | Performed by: PSYCHIATRY & NEUROLOGY

## 2022-03-14 PROCEDURE — APPSS30 APP SPLIT SHARED TIME 16-30 MINUTES: Performed by: NURSE PRACTITIONER

## 2022-03-14 PROCEDURE — 1240000000 HC EMOTIONAL WELLNESS R&B

## 2022-03-14 PROCEDURE — 99232 SBSQ HOSP IP/OBS MODERATE 35: CPT | Performed by: INTERNAL MEDICINE

## 2022-03-14 RX ADMIN — TRAZODONE HYDROCHLORIDE 50 MG: 50 TABLET ORAL at 21:03

## 2022-03-14 RX ADMIN — SERTRALINE HYDROCHLORIDE 25 MG: 25 TABLET ORAL at 07:33

## 2022-03-14 RX ADMIN — BUPROPION HYDROCHLORIDE 300 MG: 300 TABLET, FILM COATED, EXTENDED RELEASE ORAL at 07:33

## 2022-03-14 RX ADMIN — ACETAMINOPHEN 650 MG: 325 TABLET, FILM COATED ORAL at 15:32

## 2022-03-14 ASSESSMENT — PAIN SCALES - GENERAL
PAINLEVEL_OUTOF10: 1
PAINLEVEL_OUTOF10: 3

## 2022-03-14 NOTE — GROUP NOTE
Group Therapy Note    Date: 3/14/2022    Group Start Time: 1100  Group End Time: 438  Group Topic: Cognitive Skills    MARQUEZ [facility] GUERRERO Pedraza, CTRS        Group Therapy Note    Attendees: 14/20         Pt did not participate in Cognitive Skills Group at 1100am when encouraged by RT due to pt resting in room. Pt was offered talk time as an alternative to group but declined.          Discipline Responsible: Psychoeducational Specialist        Signature:  Figueroa Finnegan

## 2022-03-14 NOTE — GROUP NOTE
Group Therapy Note    Date: 3/14/2022    Group Start Time: 1000  Group End Time: 6448  Group Topic: Group Therapy    [facility]    LAURYN Cochran        Group Therapy Note  Pt declined to attend psychotherapy at 1000 am despite encouragement. Pt offered 1:1 and refused.     Attendees: 10/22             Signature:  LAURYN Cochran

## 2022-03-14 NOTE — GROUP NOTE
Group Therapy Note    Date: 3/14/2022    Group Start Time: 1430  Group End Time: 1205  Group Topic: Cognitive Skills    MARQUEZ Adamson, CTRS        Group Therapy Note    Attendees: 9/15         Pt did not participate in Cognitive Skills Group at 1430 when encouraged by RT due to pt resting in room. Pt was offered talk time as an alternative to group but declined.          Discipline Responsible: Psychoeducational Specialist        Signature:  Patti Young

## 2022-03-14 NOTE — PLAN OF CARE
[facility]  Day 3 Interdisciplinary Treatment Plan NOTE    Review Date & Time: 3/14/2022                        1300    Admission Type:   Admission Type:  Involuntary    Reason for admission:  Reason for Admission: SI, trying to stab herself with a knife, daughter called police  Estimated Length of Stay: 5-7 days  Estimated Discharge Date Update: to be determined by physician    PATIENT STRENGTHS:  Patient Strengths Strengths: Positive Support,Medication Compliance  Patient Strengths and Limitations:Limitations: Unrealistic self-view,Tendency to isolate self,Difficulty problem solving/relies on others to help solve problems,Inappropriate/potentially harmful leisure interests,Multiple barriers to leisure interests,General negative or hopeless attitude about future/recovery,Difficult relationships / poor social skills  Addictive Behavior:Addictive Behavior  In the past 3 months, have you felt or has someone told you that you have a problem with:  : None  Do you have a history of Chemical Use?: No  Do you have a history of Alcohol Use?: No  Do you have a history of Street Drug Abuse?: No  Histroy of Prescripton Drug Abuse?: No  Medical Problems:  Past Medical History:   Diagnosis Date    Depression        Risk:  Fall RiskTotal: 69  Fuad Scale Fuad Scale Score: 22  BVC    Change in scores no Changes to plan of Care no    Status EXAM:   Status and Exam  Normal: No  Facial Expression: Avoids Gaze,Flat  Affect: Blunt  Level of Consciousness: Alert  Mood:Normal: No  Mood: Depressed,Anxious,Helpless  Motor Activity:Normal: Yes  Interview Behavior: Cooperative,Evasive  Preception: [place] to Person,[place] to Time,[place] to Place,[place] to Situation  Attention:Normal: No  Attention: Distractible  Thought Processes: Other(See comment) (logical)  Thought Content:Normal: Yes  Thought Content: Preoccupations  Hallucinations: None  Delusions: No  Memory:Normal: Yes  Insight and Judgment: No  Insight and Judgment: Poor Judgment,Poor Insight,Unmotivated  Present Suicidal Ideation: No  Present Homicidal Ideation: No    Daily Assessment Last Entry:             Patient Currently in Pain: Denies  Daily Nutrition (WDL): Within Defined Limits    Patient Monitoring:  Frequency of Checks: 4 times per hour, close    Psychiatric Symptoms:   Depression Symptoms  Depression Symptoms: Appetite change,Feelings of helplessness,Feelings of hopelessess,Isolative,Loss of interest  Anxiety Symptoms  Anxiety Symptoms: Generalized  Elma Symptoms  Elma Symptoms: No problems reported or observed. Psychosis Symptoms  Delusion Type: No problems reported or observed. Suicide Risk CSSR-S:  1) Within the past month, have you wished you were dead or wished you could go to sleep and not wake up? : Yes  2) Have you actually had any thoughts of killing yourself? : Yes  3) Have you been thinking about how you might kill yourself? : Yes  5) Have you started to work out or worked out the details of how to kill yourself?  Do you intend to carry out this plan? : Yes  6) Have you ever done anything, started to do anything, or prepared to do anything to end your life?: Yes  Change in Result                no                 Change in Plan of care           no      EDUCATION:   EDUCATION:   Learner Progress Toward Treatment Goals: Reviewed results and recommendations of this team, Reviewed group plan and strategies, Reviewed signs, symptoms and risk of self harm and violent behavior, Reviewed goals and plan of care    Method:small group, individual verbal education    Outcome:verbalized by patient, but needs reinforcement to obtain goals    PATIENT GOALS:  Short term:Pt refused to meet with team,pt did not develop a short term goal.  Long term:Pt did not develop a long term goal.    PLAN/TREATMENT RECOMMENDATIONS UPDATE: continue with group therapies, increased socialization, continue planning for after discharge goals, continue with medication compliance    SHORT-TERM GOALS UPDATE:   Time frame for Short-Term Goals: 5-7 days    LONG-TERM GOALS UPDATE:   Time frame for Long-Term Goals: 6 months  Members Present in Team Meeting: See Signature Sheet    Rayshawn Briggs

## 2022-03-14 NOTE — PROGRESS NOTES
GEOVANNA CARIAS [facility] Internal Medicine  Yong Pimentel MD; Nayeli Wright MD; Hardeep Rosado MD; MD Abhijeet Mata MD; MD JOSE Irizarry [facility]   [address] / HISTORY AND PHYSICAL EXAMINATION            Date:   3/14/2022  Patient name:  Miguel Angel Grace  Date of admission:  3/13/2022 12:40 AM  MRN:   [id number]  Account:  [de-identified]  YOB: 1980  PCP:    No primary care provider on file. Room:   [address] [place] [address]  Code Status:    Full Code    Physician Requesting Consult: Almas Garner MD    Reason for Consult: Medical consult    Chief Complaint:     Chief Complaint   Patient presents with    Suicidal       History Obtained From:     patient    History of Present Illness:     73-year-old female with underlying history of morbid obesity, alcoholism, admitted to inpatient psych for suicidal ideation ,  History of depression and anxiety    Past Medical History:     Past Medical History:   Diagnosis Date    Depression         Past Surgical History:     History reviewed. No pertinent surgical history. Medications Prior to Admission:     Prior to Admission medications    Medication Sig Start Date End Date Taking? Authorizing Provider   buPROPion (WELLBUTRIN XL) 300 MG extended release tablet Take 300 mg by mouth nightly    Yes Historical Provider, MD   mirtazapine (REMERON) 30 MG tablet Take 30 mg by mouth nightly   Yes Historical Provider, MD   minocycline (MINOCIN;DYNACIN) 50 MG capsule Take 50 mg by mouth 2 times daily   Yes Historical Provider, MD        Allergies:     Patient has no known allergies. Social History:     Tobacco:    reports that she has quit smoking. She has never used smokeless tobacco.  Alcohol:      reports current alcohol use. Drug Use:  reports no history of drug use. Family History:     History reviewed. No pertinent family history.     Review of Systems:     Positive and Negative as described in HPI. CONSTITUTIONAL:  negative for fevers, chills, sweats, fatigue, weight loss  HEENT:  negative for vision, hearing changes, runny nose, throat pain  RESPIRATORY:  negative for shortness of breath, cough, congestion, wheezing. CARDIOVASCULAR:  negative for chest pain, palpitations. GASTROINTESTINAL:  negative for nausea, vomiting, diarrhea, constipation, change in bowel habits, abdominal pain   GENITOURINARY:  negative for difficulty of urination, burning with urination, frequency   INTEGUMENT:  negative for rash, skin lesions, easy bruising   HEMATOLOGIC/LYMPHATIC:  negative for swelling/edema   ALLERGIC/IMMUNOLOGIC:  negative for urticaria , itching  ENDOCRINE:  negative increase in drinking, increase in urination, hot or cold intolerance  MUSCULOSKELETAL:  negative joint pains, muscle aches, swelling of joints  NEUROLOGICAL:  negative for headaches, dizziness, lightheadedness, numbness, pain, tingling extremities    Physical Exam:     BP (!) 142/92   Pulse 87   Temp 97.4 °F (36.3 °C) (Oral)   Resp 14   Ht 5' 5\" (1.651 m)   Wt 220 lb (99.8 kg)   LMP 2022   SpO2 100%   BMI 36.61 kg/m²   Temp (24hrs), Av °F (36.7 °C), Min:97.4 °F (36.3 °C), Max:98.6 °F (37 °C)    No results for input(s): POCGLU in the last 72 hours. No intake or output data in the 24 hours ending 22 1424    General Appearance:  alert, well appearing, and in no acute distress  Mental status: oriented to person, place, and time with normal affect  Head:  normocephalic, atraumatic.   Eye: no icterus, redness, pupils equal and reactive, extraocular eye movements intact, conjunctiva clear  Ear: normal external ear, no discharge, hearing intact  Nose:  no drainage noted  Mouth: mucous membranes moist  Neck: supple, no carotid bruits, thyroid not palpable  Lungs: Bilateral equal air entry, clear to ausculation, no wheezing, rales or rhonchi, normal effort  Cardiovascular: normal rate, regular rhythm, no murmur, gallop, rub. Abdomen: Soft, nontender, nondistended, normal bowel sounds, no hepatomegaly or splenomegaly  Neurologic: There are no new focal motor or sensory deficits, normal muscle tone and bulk, no abnormal sensation, normal speech, cranial nerves II through XII grossly intact  Skin: No gross lesions, rashes, bruising or bleeding on exposed skin area  Extremities:  peripheral pulses palpable, no pedal edema or calf pain with palpation  Psych: normal affect    Investigations:      Laboratory Testing:  No results found for this or any previous visit (from the past 24 hour(s)). Imaging/Diagonstics:  No results found. Assessment :      Hospital Problems           Last Modified POA    * (Principal) Major depressive disorder, recurrent episode, severe with anxious distress ([facility] [address] 3/13/2022 Yes    Alcoholism ([facility] [address] 3/13/2022 Yes    Morbid obesity ([facility] [address] 3/13/2022 Yes          Plan:     3 61-year-old female with underlying history of anxiety and depression, admitted to inpatient psych for suicidal ideations,  2. Alcoholism, watch for withdrawal,   3. Morbid obesity, diet and exercise advised,  4. Full CODE STATUS    Consultations:   IP CONSULT TO INTERNAL MEDICINE      Brandon Grey MD  3/14/2022  2:24 PM    Copy sent to Dr. Felecia Skinner primary care provider on file. Please note that this chart was generated using voice recognition Dragon dictation software. Although every effort was made to ensure the accuracy of this automated transcription, some errors in transcription may have occurred.

## 2022-03-14 NOTE — PLAN OF CARE
Problem: Suicide risk  Goal: Provide patient with safe environment  Description: Provide patient with safe environment  3/13/2022 2038 by Aung Weaver RN  Outcome: Ongoing   Safe environment provided. Problem: Altered Mood, Depressive Behavior:  Goal: Able to verbalize support systems  Description: Able to verbalize support systems  3/13/2022 2038 by Aung Weaver RN  Outcome: Ongoing   Does not discuss support systems despite encouragement to do so. Problem: Depressive Behavior With or Without Suicide Precautions:  Goal: Able to verbalize acceptance of life and situations over which he or she has no control  Description: Able to verbalize acceptance of life and situations over which he or she has no control  3/13/2022 2038 by Aung Weaver RN  Outcome: Ongoing   She does not discuss issues with me despite encouragement to do so.

## 2022-03-14 NOTE — PROGRESS NOTES
Daily Progress Note  3/14/2022    Patient Name: Khoa Teresa    CHIEF COMPLAINT:  Suicidal ideation with interrupted attempt to end life by stabbing self. Emergency Medications: Has not required     Scheduled medications: Adherent with Wellbutrin 300 mg and sertraline 25 mg    SUBJECTIVE:     Patient seen face-to-face for follow-up assessment. She is sitting on her bed, looking at the doorway as writer enters. She appears anxious and on edge. She has been isolating to self. Does not engage in the milieu or attend group programming. States that her  visited yesterday, but they were unable to talk about anything relevant or serious because she was concerned that other patients would overhear their conversation. Patient may be paranoid. She has been minimizing events that led to admission. Patient's  and son needed to restrain her while her daughter called 47 and verbalized that her mother may have killed herself by stabbing. When attempting to discuss this, patient becomes mute. Does not respond. Tears feel her eyes. She however is guarded and evasive in describing her feelings. States that she feels fine, however her affect is noncongruent. Appears to be in a significant amount of emotional distress. Patient reports poor sleep overnight. Requested as needed hydroxyzine around 4:30 AM as she was restless with racing thoughts. She is unable to contract for safety off unit. Patient has not demonstrated stability. Significant concern for patient at this time. We will continue inpatient hospitalization for stability.     Appetite:  [] Normal/Adequate/Unchanged  [] Increased  [x] Decreased      Sleep:       [] Normal/Adequate/Unchanged  [] Fair  [x] Poor      Group Attendance on Unit:   [] Yes  [] Selectively    [x] No    Medication Side Effects: denies         Mental Status Exam  Level of consciousness: Alert and awake   Appearance: Appropriate attire for setting, seated on bed, with fair  grooming and hygiene   Behavior/Motor: Approachable, fidgeting, self soothing behaviors  Attitude toward examiner: Semicooperative, evasive, not forthcoming, appears paranoid  Speech: spontaneous, normal rate, normal volume and well articulated, selectively mute at times  Mood: Patient reports \"fine\"  Affect: Noncongruent. Appears depressed and anxious  Thought processes: linear and goal directed, evasive  Thought content: Denies homicidal ideation  Suicidal Ideation: Reports improving suicidal ideation, however continues to appear depressed and anxious. Unable to contract for safety of unit  Delusions: Paranoid  Perceptual Disturbance: Denies. Patient does not appear to be responding to internal stimuli. Cognition: Oriented to self, location, time, and situation  Memory: intact  Insight & Judgement: poor     Data   height is 5' 5\" (1.651 m) and weight is 220 lb (99.8 kg). Her oral temperature is 97.4 °F (36.3 °C). Her blood pressure is 142/92 (abnormal) and her pulse is 87. Her respiration is 14 and oxygen saturation is 100%.    Labs:   Admission on 03/13/2022   Component Date Value Ref Range Status    WBC 03/13/2022 11.0  3.5 - 11.0 k/uL Final    RBC 03/13/2022 4.46  4.0 - 5.2 m/uL Final    Hemoglobin 03/13/2022 12.0  12.0 - 16.0 g/dL Final    Hematocrit 03/13/2022 36.4  36 - 46 % Final    MCV 03/13/2022 81.6  80 - 100 fL Final    MCH 03/13/2022 26.9  26 - 34 pg Final    MCHC 03/13/2022 33.0  31 - 37 g/dL Final    RDW 03/13/2022 16.5* 11.5 - 14.9 % Final    Platelets 60/67/6388 298  150 - 450 k/uL Final    MPV 03/13/2022 7.6  6.0 - 12.0 fL Final    Seg Neutrophils 03/13/2022 75* 36 - 66 % Final    Lymphocytes 03/13/2022 17* 24 - 44 % Final    Monocytes 03/13/2022 7  1 - 7 % Final    Eosinophils % 03/13/2022 0  0 - 4 % Final    Basophils 03/13/2022 1  0 - 2 % Final    Segs Absolute 03/13/2022 8.20  1.3 - 9.1 k/uL Final    Absolute Lymph # 03/13/2022 1.90  1.0 - 4.8 k/uL Final    Absolute Mono # 03/13/2022 0.80  0.1 - 1.3 k/uL Final    Absolute Eos # 03/13/2022 0.00  0.0 - 0.4 k/uL Final    Basophils Absolute 03/13/2022 0.10  0.0 - 0.2 k/uL Final    Glucose 03/13/2022 109* 70 - 99 mg/dL Final    BUN 03/13/2022 14  6 - 20 mg/dL Final    CREATININE 03/13/2022 0.94* 0.50 - 0.90 mg/dL Final    Calcium 03/13/2022 9.5  8.6 - 10.4 mg/dL Final    Sodium 03/13/2022 139  135 - 144 mmol/L Final    Potassium 03/13/2022 3.8  3.7 - 5.3 mmol/L Final    Chloride 03/13/2022 104  98 - 107 mmol/L Final    CO2 03/13/2022 23  20 - 31 mmol/L Final    Anion Gap 03/13/2022 12  9 - 17 mmol/L Final    GFR Non- 03/13/2022 >60  >60 mL/min Final    GFR  03/13/2022 >60  >60 mL/min Final    GFR Comment 03/13/2022        Final    Comment: Average GFR for 38-51 years old:   80 mL/min/1.73sq m  Chronic Kidney Disease:   <60 mL/min/1.73sq m  Kidney failure:   <15 mL/min/1.73sq m              eGFR calculated using average adult body mass. Additional eGFR calculator available at:        [employer]            hCG Qual 03/13/2022 NEGATIVE  NEGATIVE Final    Comment: Specimens with hCG levels near the threshold of the test (25 mIU/mL) may give a negative or   indeterminate result. In such cases, another test should be performed with a new specimen   in 48-72 hours. If early pregnancy is suspected clinically in this setting, correlation   with quantitative serum b-hCG level is suggested.  Specimen Description 03/13/2022 . NASOPHARYNGEAL SWAB   Final    SARS-CoV-2, Rapid 03/13/2022 Not Detected  Not Detected Final    Comment:       Rapid NAAT:  The specimen is NEGATIVE for SARS-CoV-2, the novel coronavirus associated with   COVID-19. The ID NOW COVID-19 assay is designed to detect the virus that causes COVID-19 in patients   with signs and symptoms of infection who are suspected of COVID-19.   An individual without symptoms of COVID-19 and who is not shedding SARS-CoV-2 virus would   expect to have a negative (not detected) result in this assay. Negative results should be treated as presumptive and, if inconsistent with clinical signs   and symptoms or necessary for patient management,  should be tested with an alternative molecular assay. Negative results do not preclude   SARS-CoV-2 infection and   should not be used as the sole basis for patient management decisions. Fact sheet for Healthcare Providers: Leo  Fact sheet for Patients: Leo          Methodology: Isothermal Nucleic Acid Amplification      Ethanol 03/13/2022 <10  <10 mg/dL Final    Ethanol percent 03/13/2022 <0.010  % Final         Reviewed patient's current plan of care and vital signs with nursing staff.     Labs reviewed: [x] Yes  Last EKG in EMR reviewed: [x] Yes    Medications  Current Facility-Administered Medications: acetaminophen (TYLENOL) tablet 650 mg, 650 mg, Oral, Q4H PRN  aluminum & magnesium hydroxide-simethicone (MAALOX) 200-200-20 MG/5ML suspension 30 mL, 30 mL, Oral, Q6H PRN  hydrOXYzine (ATARAX) tablet 50 mg, 50 mg, Oral, TID PRN  ibuprofen (ADVIL;MOTRIN) tablet 400 mg, 400 mg, Oral, Q6H PRN  nicotine polacrilex (NICORETTE) gum 2 mg, 2 mg, Oral, PRN  polyethylene glycol (GLYCOLAX) packet 17 g, 17 g, Oral, Daily PRN  traZODone (DESYREL) tablet 50 mg, 50 mg, Oral, Nightly PRN  haloperidol lactate (HALDOL) injection 5 mg, 5 mg, IntraMUSCular, Q4H PRN **AND** LORazepam (ATIVAN) injection 2 mg, 2 mg, IntraMUSCular, Q4H PRN **AND** diphenhydrAMINE (BENADRYL) injection 50 mg, 50 mg, IntraMUSCular, Q4H PRN  haloperidol (HALDOL) tablet 5 mg, 5 mg, Oral, Q4H PRN **AND** LORazepam (ATIVAN) tablet 2 mg, 2 mg, Oral, Q4H PRN  buPROPion (WELLBUTRIN XL) extended release tablet 300 mg, 300 mg, Oral, Daily  sertraline (ZOLOFT) tablet 25 mg, 25 mg, Oral, Daily    ASSESSMENT  Major depressive disorder, recurrent episode, severe with anxious distress ([facility] [address]         HANDOFF  Patient symptoms are:  Unstable  Medications as determined by attending physician     Encourage participation in groups and milieu. Probable discharge is to be determined by MD      Electronically signed by BETTIE Stauffer CNP on 3/14/2022 at 3:26 PM    **This report has been created using voice recognition software. It may contain minor errors which are inherent in voice recognition technology. **  I independently saw and evaluated the patient. I reviewed the  documentation above. Any additional comments or changes to the midlevel provider's documentation are stated below otherwise agree with assessment. The patient has been isolative. She is saying that she has slept okay. She is denying suicidal ideation. Have noted that the patient has avoided interacting with peers. She is concerned that people might be hearing what is being said during interview. We will continue to monitor for signs of psychosis. The patient may require an antipsychotic. We will increase the dose of the antidepressant as noted below      PLAN  Zoloft to 50mg daily. Other medications as above. Attempt to develop insight  Psycho-education conducted. Supportive Therapy conducted.   Probable discharge is in 2-3 days  Follow-up daily while on inpatient unit    Electronically signed by Mague Christiansen MD on 3/14/22 at 11:04 PM EDT

## 2022-03-15 VITALS
DIASTOLIC BLOOD PRESSURE: 97 MMHG | SYSTOLIC BLOOD PRESSURE: 153 MMHG | HEART RATE: 79 BPM | WEIGHT: 220 LBS | BODY MASS INDEX: 36.65 KG/M2 | OXYGEN SATURATION: 100 % | RESPIRATION RATE: 14 BRPM | TEMPERATURE: 97.6 F | HEIGHT: 65 IN

## 2022-03-15 PROCEDURE — 99239 HOSP IP/OBS DSCHRG MGMT >30: CPT | Performed by: PSYCHIATRY & NEUROLOGY

## 2022-03-15 PROCEDURE — 6370000000 HC RX 637 (ALT 250 FOR IP): Performed by: PSYCHIATRY & NEUROLOGY

## 2022-03-15 RX ORDER — SERTRALINE HYDROCHLORIDE 25 MG/1
25 TABLET, FILM COATED ORAL DAILY
Qty: 30 TABLET | Refills: 3 | Status: SHIPPED | OUTPATIENT
Start: 2022-03-16

## 2022-03-15 RX ADMIN — SERTRALINE HYDROCHLORIDE 25 MG: 25 TABLET ORAL at 08:13

## 2022-03-15 RX ADMIN — BUPROPION HYDROCHLORIDE 300 MG: 300 TABLET, FILM COATED, EXTENDED RELEASE ORAL at 08:13

## 2022-03-15 NOTE — GROUP NOTE
Group Therapy Note    Date: 3/15/2022    Group Start Time: 0900  Group End Time: 0915  Group Topic: Group Therapy    MARQUEZ Chapman        Group Therapy Note    Attendees: 12/19       patient refused to attend goal group at 0900 after encouragement from staff.  1:1 talk time offered but refused      Signature:  Thomas Waterman

## 2022-03-15 NOTE — BH NOTE
Patient given tobacco quitline number 99739673524 at this time, refusing to call at this time, states \" I just dont want to quit now\"- patient given information as to the dangers of long term tobacco use. Continue to reinforce the importance of tobacco cessation.

## 2022-03-15 NOTE — DISCHARGE INSTR - OTHER ORDERS
Please take your medication as prescribed and go to your follow up appointments. Avoid alcohol and street drugs.

## 2022-03-15 NOTE — GROUP NOTE
Group Therapy Note    Date: 3/15/2022    Group Start Time: 1100  Group End Time: 1140  Group Topic: Cognitive Skills    MARQUEZ Ramirez, CTRS        Group Therapy Note    Attendees: 9/19         Pt did not participate in Cognitive Skills Group at 1100am when encouraged by RT due to pt resting in room. Pt was offered talk time as an alternative to group but declined.          Discipline Responsible: Psychoeducational Specialist        Signature:  Jd Rodriguez

## 2022-03-15 NOTE — BH NOTE
[facility]  Discharge Note    Pt discharged with followings belongings:   Dental Appliances: None  Vision - Corrective Lenses: None  Hearing Aid: None  Jewelry: None  Body Piercings Removed: N/A  Clothing: Sweater,Footwear,Socks,Pants,Undergarments (Comment) (underwear, bra)  Were All Patient Medications Collected?: Not Applicable  Other Valuables: Cell phone (watch, )   Valuables sent home with patient or returned to patient. Patient education on aftercare instructions: given to patient  Information faxed to [facility] by nurse  at 4:36 PM .Patient verbalize understanding of AVS:  Yes, discharged home with .     Status EXAM upon discharge:  Status and Exam  Normal: No  Facial Expression: Avoids Gaze  Affect: Appropriate  Level of Consciousness: Alert  Mood:Normal: No  Mood: Depressed  Motor Activity:Normal: No  Motor Activity: Decreased  Interview Behavior: Cooperative  Preception: Bena to Person,Bena to Time,Bena to Place,Bena to Situation  Attention:Normal: Yes  Attention: Distractible  Thought Processes: Blocking  Thought Content:Normal: Yes  Thought Content: Poverty of Content  Hallucinations: None  Delusions: No  Memory:Normal: Yes  Memory: Poor Recent,Poor Remote  Insight and Judgment: Yes  Insight and Judgment: Poor Judgment,Poor Insight  Present Suicidal Ideation: No  Present Homicidal Ideation: No      Metabolic Screening:    No results found for: LABA1C    No results found for: CHOL  No results found for: TRIG  No results found for: HDL  No components found for: LDLCAL  No results found for: Fermin Pedersen LPN

## 2022-03-15 NOTE — GROUP NOTE
HS Group   Date: March 14, 2022     Patient did not participate in HS group. 1:1 talk time was offered as an alternative. Will continue to encourage patient to participate in unit programming.      Signature: ERIC Al

## 2022-03-15 NOTE — PLAN OF CARE
Problem: Suicide risk  Goal: Provide patient with safe environment  Description: Provide patient with safe environment  3/14/2022 2130 by Britany Jefferson LPN  Outcome: Ongoing  Safe environment maintained. Safety checks continued every 15 minutes and intermittently. Patient agrees to contract for safety    Problem: Depressive Behavior With or Without Suicide Precautions:  Goal: Able to verbalize acceptance of life and situations over which he or she has no control  Description: Able to verbalize acceptance of life and situations over which he or she has no control  3/14/2022 2130 by Britany Jefferson LPN  Outcome: Ongoing  Patient denies thoughts of suicide or self-harm and denies hallucinations. Patient agrees to seek out staff should negative thoughts arise. Safe environment maintained. Will continue to monitor for safety and provide support and reassurance as needed.

## 2022-03-15 NOTE — GROUP NOTE
Group Therapy Note    Date: 3/15/2022    Group Start Time: 1000  Group End Time: 8216  Group Topic: Psychoeducation    MARQUEZ [facility]    SCOTT Patrick LSW        Group Therapy Note    patient refused to attend psychoeducational group at 10:00am after encouragement from staff.        Signature:  SCOTT Patrick LSW

## 2022-03-15 NOTE — DISCHARGE SUMMARY
DISCHARGE SUMMARY      Patient ID:  Ridge Hutton  [id number]  8 y.o.  1980    Admit date: 3/13/2022    Discharge date and time: 3/15/2022    Disposition: Home    Admitting Physician: Jamal Lamar MD     Discharge Physician: Dr Soila Dubon MD    Admission Diagnoses: Suicide attempt [facility]) Jai Grier  Depression with suicidal ideation [F32. A, R45.851]    Admission Condition: poor    Discharged Condition: stable    Admission Circumstance: Ridge Hutton is a 37 y.o. female who has a past medical history of depression, hyperlipidemia, restless leg syndrome, and insomnia. Patient presented to the ED with EMS following an attempt to harm herself with a knife at home. Noted that patient required physical restraint in her home by her  and child as she was attempting to stab herself with a knife. Patient admitted involuntarily. Did not sign in upon presentation to unit.     Patient is resting in bed at time of assessment. Quiet and reluctant to engage in conversation. She is tearful. Minimizing of events that led to admission. She reports significant marital stressors. States that she and her  have been having problems for years and argue frequently. 3 months ago, patient reports her  invited a female friend to live with them and she believes they are having an affair. Patient has been having difficulty with this living situation. Endorses significant depression over the last few months and has been having thoughts to end her life. She states that there was a verbal altercation with the woman and patient lost control. She is evasive in describing events, but per ED documentation she grabbed a knife from her 's pocket and ran into the kitchen attempting to harm herself.     Patient endorses depressive episodes lasting 2 weeks or longer in which she feels down and depressed more days than not, for most of the day.   She states that during her depressive episodes she has decreased energy and motivation, feelings of hopelessness and helplessness, insomnia (particularly with falling asleep and waking up multiple times throughout the evening). She endorses anhedonia and has been withdrawing from friends, family, and activities she once enjoyed. States that she is having difficulty focusing and concentrating. She feels that over the past year she has had significant problems with anxiety that she identifies as linked with her mood. Denies having anxiety without mood disturbance. Patient unable to identify any manic/hypomanic episodes. She does feel that when she is depressed her mind begins playing tricks on her and she over exaggerate situations. She denies perceptual disturbances. Believes it may be possible that she experiences paranoia. She denies receiving special messages from the media or grandiose thoughts about self.     Patient denies any previous psychiatric admissions. Does not follow-up with any mental health providers. Is prescribed Wellbutrin 300 mg and mirtazapine. Reports that she has been on Wellbutrin for many years and is unsure if it has any effect. Mirtazapine was added recently for insomnia, which she reports has been beneficial.  Previously trialed sertraline 15 years ago. Patient denies seeing a therapist but does feel that it would be helpful. Patient denies any legal history. She denies recreational substance use. Does endorse consumption of approximately 2 glasses of wine every other day of the week. Urine toxicology less than 0.01%. No urine toxicology available to review at this time.     Patient requires inpatient hospitalization for safety and stability as she is a significant threat to self. PAST MEDICAL/PSYCHIATRIC HISTORY:   Past Medical History:   Diagnosis Date    Depression        FAMILY/SOCIAL HISTORY:  History reviewed. No pertinent family history.   Social History     Socioeconomic History    Marital status:      Spouse name: Not on file    Number of children: Not on file    Years of education: Not on file    Highest education level: Not on file   Occupational History    Not on file   Tobacco Use    Smoking status: Former Smoker    Smokeless tobacco: Never Used   Substance and Sexual Activity    Alcohol use: Yes     Comment: wine twice a week    Drug use: Never    Sexual activity: Not on file   Other Topics Concern    Not on file   Social History Narrative    Not on file     Social Determinants of Health     Financial Resource Strain:     Difficulty of Paying Living Expenses: Not on file   Food Insecurity:     Worried About [address] in the Last Year: Not on file    Ignacio of Food in the Last Year: Not on file   Transportation Needs:     Lack of Transportation (Medical): Not on file    Lack of Transportation (Non-Medical):  Not on file   Physical Activity:     Days of Exercise per Week: Not on file    Minutes of Exercise per Session: Not on file   Stress:     Feeling of Stress : Not on file   Social Connections:     Frequency of Communication with Friends and Family: Not on file    Frequency of Social Gatherings with Friends and Family: Not on file    Attends Christian Services: Not on file    Active Member of [address] [place] [address] or Organizations: Not on file    Attends Club or Organization Meetings: Not on file    Marital Status: Not on file   Intimate Partner Violence:     Fear of Current or Ex-Partner: Not on file    Emotionally Abused: Not on file    Physically Abused: Not on file    Sexually Abused: Not on file   Housing Stability:     Unable to Pay for Housing in the Last Year: Not on file    Number of [place] in the Last Year: Not on file    Unstable Housing in the Last Year: Not on file       MEDICATIONS:    Current Facility-Administered Medications:     acetaminophen (TYLENOL) tablet 650 mg, 650 mg, Oral, Q4H PRN, Marvel Najjar, MD, 650 mg at 03/14/22 1532    aluminum & magnesium hydroxide-simethicone (MAALOX) [phone] MG/5ML suspension 30 mL, 30 mL, Oral, Q6H PRN, Mine Hills MD    hydrOXYzine (ATARAX) tablet 50 mg, 50 mg, Oral, TID PRN, Mine Hills MD, 50 mg at 03/13/22 0438    ibuprofen (ADVIL;MOTRIN) tablet 400 mg, 400 mg, Oral, Q6H PRN, Mine Hills MD    nicotine polacrilex (NICORETTE) gum 2 mg, 2 mg, Oral, PRN, Mine Hills MD    polyethylene glycol (GLYCOLAX) packet 17 g, 17 g, Oral, Daily PRN, Mine Hills MD    traZODone (DESYREL) tablet 50 mg, 50 mg, Oral, Nightly PRN, Mine Hills MD, 50 mg at 03/14/22 2103    haloperidol lactate (HALDOL) injection 5 mg, 5 mg, IntraMUSCular, Q4H PRN **AND** LORazepam (ATIVAN) injection 2 mg, 2 mg, IntraMUSCular, Q4H PRN **AND** diphenhydrAMINE (BENADRYL) injection 50 mg, 50 mg, IntraMUSCular, Q4H PRN, Mine Hills MD    haloperidol (HALDOL) tablet 5 mg, 5 mg, Oral, Q4H PRN **AND** LORazepam (ATIVAN) tablet 2 mg, 2 mg, Oral, Q4H PRN, Mine Hills MD    buPROPion (WELLBUTRIN XL) extended release tablet 300 mg, 300 mg, Oral, Daily, Mine Hills MD, 300 mg at 03/15/22 0813    sertraline (ZOLOFT) tablet 25 mg, 25 mg, Oral, Daily, Mine Hills MD, 25 mg at 03/15/22 0813    Examination:  BP (!) 153/97   Pulse 79   Temp 97.6 °F (36.4 °C) (Oral)   Resp 14   Ht 5' 5\" (1.651 m)   Wt 220 lb (99.8 kg)   LMP 03/08/2022   SpO2 100%   BMI 36.61 kg/m²   Gait - steady    HOSPITAL COURSE[de-identified]  Following admission to the hospital, patient had a complete physical exam and blood work up. The patient was referred to Internal Medicine. Patient was monitored closely with suicide precaution  Patient was started on Zoloft 25mg daily in addition to Wellbutrin XL 300mg that she had been taking prior to admission. Patient also spoke to her  and was told that her 's friend will be leaving the home.   Was encouraged to participate in group and other milieu activity  Patient started to feel better with this combination of treatment. Significant progress in the symptoms since admission. Mood is improved  The patient denies AVH or paranoid thoughts  The patient denies any hopelessness or worthlessness  No active SI/HI  Appetite:  [x] Normal  [] Increased  [] Decreased    Sleep:       [x] Normal  [] Fair       [] Poor            Energy:    [x] Normal  [] Increased  [] Decreased     SI [] Present  [x] Absent  HI  []Present  [x] Absent   Aggression:  [] yes  [] no  Patient is [x] able  [] unable to CONTRACT FOR SAFETY   Medication side effects(SE):  [x] None(Psych. Meds.) [] Other      Mental Status Examination on discharge:    Level of consciousness:  within normal limits   Appearance:  well-appearing  Behavior/Motor:  no abnormalities noted  Attitude toward examiner:  attentive and good eye contact  Speech:  spontaneous, normal rate and normal volume   Mood: euthymic  Affect:  mood congruent  Thought processes:  linear, goal directed and coherent   Thought content:  Suicidal Ideation:  denies suicidal ideation  Delusions:  no evidence of delusions  Perceptual Disturbance:  denies any perceptual disturbance  Cognition:  oriented to person, place, and time   Concentration intact  Memory intact  Insight good   Judgement fair   Fund of Knowledge adequate      ASSESSMENT:  Patient symptoms are:  [x] Well controlled  [x] Improving  [] Worsening  [] No change      Diagnosis:  Principal Problem:    Major depressive disorder, recurrent episode, severe with anxious distress ([facility]  Active Problems:    Alcoholism ([facility]    Morbid obesity ([facility]  Resolved Problems:    * No resolved hospital problems. *      LABS:    Recent Labs     03/13/22  0157   WBC 11.0   HGB 12.0        Recent Labs     03/13/22  0157      K 3.8      CO2 23   BUN 14   CREATININE 0.94*   GLUCOSE 109*     No results for input(s): BILITOT, ALKPHOS, AST, ALT in the last 72 hours.   No results found for: Sally Jeff [address], [address] [place], [place] [facility] [[email or web address]] [address] [place] [address], Jettie Keto, PPXUR, ETOH  No results found for: TSH, FREET4  No results found for: LITHIUM  No results found for: VALPROATE, CBMZ    RISK ASSESSMENT AT DISCHARGE: Low risk for suicide and homicide. Treatment Plan:  Reviewed current Medications with the patient. Education provided on the complaince with treatment. Risks, benefits, side effects, drug-to-drug interactions and alternatives to treatment were discussed. Encourage patient to attend outpatient follow up appointment and therapy. Patient was advised to call the outpatient provider, visit the nearest ED or call 911 if symptoms are not manageable. Medication List      START taking these medications    sertraline 25 MG tablet  Commonly known as: ZOLOFT  Take 1 tablet by mouth daily  Start taking on: March 16, 2022        Isaiah German taking these medications    buPROPion 300 MG extended release tablet  Commonly known as: WELLBUTRIN XL        STOP taking these medications    minocycline 50 MG capsule  Commonly known as: MINOCIN;DYNACIN     mirtazapine 30 MG tablet  Commonly known as: REMERON           Where to Get Your Medications      These medications were sent to [address] [place] [address], [address] [place] [address],[address]  [address] [place] [id number]    Phone: [phone]   · sertraline 25 MG tablet               Core Measures statement:   Not applicable      TIME SPENT - 35 MINUTES TO COMPLETE THE EVALUATION, DISCHARGE SUMMARY, MEDICATION RECONCILIATION AND FOLLOW UP CARE                                         Mannie Santos is a 37 y.o. female being evaluated Marvin Farfan MD on 3/15/2022 at 2:51 PM    An electronic signature was used to authenticate this note. **This report has been created using voice recognition software. It may contain minor errors which are inherent in voice recognition technology. **

## 2022-03-15 NOTE — GROUP NOTE
Group Therapy Note    Date: 3/15/2022    Group Start Time: 1430  Group End Time: 1525  Group Topic: Cognitive Skills    MARQUEZ BHI GUERRERO Ochoa, CTRS        Group Therapy Note    Attendees: 11/16         Pt did not participate in Cognitive Skills Group at 1430 when encouraged by RT due to pt resting in room. Pt was offered talk time as an alternative to group but declined. Pt is waiting to be discharged.         Discipline Responsible: Psychoeducational Specialist        Signature:  Jing Stallworth